# Patient Record
Sex: MALE | Race: WHITE | Employment: OTHER | URBAN - METROPOLITAN AREA
[De-identification: names, ages, dates, MRNs, and addresses within clinical notes are randomized per-mention and may not be internally consistent; named-entity substitution may affect disease eponyms.]

---

## 2018-12-04 ENCOUNTER — TRANSFERRED RECORDS (OUTPATIENT)
Dept: HEALTH INFORMATION MANAGEMENT | Facility: CLINIC | Age: 63
End: 2018-12-04

## 2019-01-04 ENCOUNTER — TRANSFERRED RECORDS (OUTPATIENT)
Dept: HEALTH INFORMATION MANAGEMENT | Facility: CLINIC | Age: 64
End: 2019-01-04

## 2019-01-04 LAB — EJECTION FRACTION: 56

## 2019-01-08 ENCOUNTER — TRANSFERRED RECORDS (OUTPATIENT)
Dept: HEALTH INFORMATION MANAGEMENT | Facility: CLINIC | Age: 64
End: 2019-01-08

## 2019-04-11 ENCOUNTER — TRANSFERRED RECORDS (OUTPATIENT)
Dept: HEALTH INFORMATION MANAGEMENT | Facility: CLINIC | Age: 64
End: 2019-04-11

## 2019-04-18 ENCOUNTER — TRANSFERRED RECORDS (OUTPATIENT)
Dept: HEALTH INFORMATION MANAGEMENT | Facility: CLINIC | Age: 64
End: 2019-04-18

## 2019-05-14 ENCOUNTER — TRANSFERRED RECORDS (OUTPATIENT)
Dept: HEALTH INFORMATION MANAGEMENT | Facility: CLINIC | Age: 64
End: 2019-05-14

## 2019-05-20 NOTE — TELEPHONE ENCOUNTER
ONCOLOGY INTAKE: Records Information     APPT INFORMATION:  Referring provider: Dr. Sae Lambert  Referring provider s clinic:  [facility]  Reason for visit/diagnosis:  Possible airway dilation, hx of sarcoidosis and histoplasmosis  Has patient been notified of appointment date and time?: Yes    RECORDS INFORMATION:  Were the records received with the referral (via [employer])? Yes - Referral only    Has patient been seen for any external appt for this diagnosis? Yes    If yes, where? [facility]    Has patient had any imaging or procedures outside of [place]  [place] for this condition? Yes  If Yes, where? [facility]     ADDITIONAL INFORMATION:  Patient states he signed an FREDY for [facility]

## 2019-05-21 NOTE — TELEPHONE ENCOUNTER
RECORDS STATUS - ALL OTHER DIAGNOSIS      RECORDS RECEIVED FROM: [facility]/""   DATE RECEIVED:    NOTES STATUS DETAILS   OFFICE NOTE from referring provider CE-Tamara Lambert, most recent OV 5/16/19   OFFICE NOTE from medical oncologist     DISCHARGE SUMMARY from hospital     DISCHARGE REPORT from the ER     OPERATIVE REPORT     MEDICATION LIST     CLINICAL TRIAL TREATMENTS TO DATE     LABS     PATHOLOGY REPORTS     ANYTHING RELATED TO DIAGNOSIS     GENONOMIC TESTING     TYPE:     IMAGING (NEED IMAGES & REPORT)     CT SCANS CE-""/[facility] 1/4/19 - Report in CE, Requested.    MRI     MAMMO     ULTRASOUND     PET     Complete PFT W/ Bronchodilator CE- [facility] 4/18/19, Report in CE   XR CE-""/[facility] 12/4/18, Report in CE - Requested

## 2019-05-22 DIAGNOSIS — J98.8: Primary | ICD-10-CM

## 2019-05-22 NOTE — TELEPHONE ENCOUNTER
Images from Tamara resolved, and now viewable in PACS    17 pages of PFT's rec'd from [facility], faxed to HIM  3:10 PM

## 2019-05-22 NOTE — TELEPHONE ENCOUNTER
Images requested from Tamara/[facility]. Nam w/ [facility] in Med Rec - he will fax over PFT's  2:20 PM

## 2019-06-23 ASSESSMENT — ENCOUNTER SYMPTOMS
FATIGUE: 1
SHORTNESS OF BREATH: 1

## 2019-06-25 ENCOUNTER — ANCILLARY PROCEDURE (OUTPATIENT)
Dept: CT IMAGING | Facility: CLINIC | Age: 64
End: 2019-06-25
Attending: CLINICAL NURSE SPECIALIST
Payer: COMMERCIAL

## 2019-06-25 ENCOUNTER — PRE VISIT (OUTPATIENT)
Dept: PULMONOLOGY | Facility: CLINIC | Age: 64
End: 2019-06-25

## 2019-06-25 ENCOUNTER — OFFICE VISIT (OUTPATIENT)
Dept: PULMONOLOGY | Facility: CLINIC | Age: 64
End: 2019-06-25
Attending: INTERNAL MEDICINE
Payer: COMMERCIAL

## 2019-06-25 VITALS
WEIGHT: 160.9 LBS | RESPIRATION RATE: 18 BRPM | OXYGEN SATURATION: 98 % | HEART RATE: 77 BPM | SYSTOLIC BLOOD PRESSURE: 148 MMHG | DIASTOLIC BLOOD PRESSURE: 95 MMHG | BODY MASS INDEX: 25.86 KG/M2 | HEIGHT: 66 IN | TEMPERATURE: 98.4 F

## 2019-06-25 DIAGNOSIS — J98.8: ICD-10-CM

## 2019-06-25 DIAGNOSIS — J98.8: Primary | ICD-10-CM

## 2019-06-25 DIAGNOSIS — R91.8 PULMONARY NODULES: Primary | ICD-10-CM

## 2019-06-25 RX ORDER — BUDESONIDE AND FORMOTEROL FUMARATE DIHYDRATE 80; 4.5 UG/1; UG/1
2 AEROSOL RESPIRATORY (INHALATION)
COMMUNITY
Start: 2005-02-01

## 2019-06-25 RX ORDER — FEXOFENADINE HCL 180 MG/1
180 TABLET ORAL DAILY
COMMUNITY
Start: 2018-07-15

## 2019-06-25 RX ORDER — ATORVASTATIN CALCIUM 10 MG/1
10 TABLET, FILM COATED ORAL DAILY
COMMUNITY
Start: 2018-09-01

## 2019-06-25 RX ORDER — ALBUTEROL SULFATE 90 UG/1
2 AEROSOL, METERED RESPIRATORY (INHALATION)
COMMUNITY
Start: 2017-11-20

## 2019-06-25 RX ORDER — CETIRIZINE HYDROCHLORIDE 10 MG/1
10 TABLET ORAL 2 TIMES DAILY
COMMUNITY
Start: 2019-06-01

## 2019-06-25 RX ORDER — PAROXETINE 40 MG/1
40 TABLET, FILM COATED ORAL EVERY MORNING
COMMUNITY

## 2019-06-25 ASSESSMENT — PAIN SCALES - GENERAL: PAINLEVEL: NO PAIN (0)

## 2019-06-25 ASSESSMENT — MIFFLIN-ST. JEOR: SCORE: 1457.59

## 2019-06-25 NOTE — NURSING NOTE
"Oncology Rooming Note    June 25, 2019 7:27 AM   Eddie Jones is a 63 year old male who presents for:    Chief Complaint   Patient presents with     Oncology Clinic Visit     Pt is here for a New Lung Nodule     Initial Vitals: Blood Pressure (Abnormal) 148/95   Pulse 77   Temperature 98.4  F (36.9  C) (Oral)   Respiration 18   Height 1.676 m (5' 6\")   Weight 73 kg (160 lb 14.4 oz)   Oxygen Saturation 98%   Body Mass Index 25.97 kg/m   Estimated body mass index is 25.97 kg/m  as calculated from the following:    Height as of this encounter: 1.676 m (5' 6\").    Weight as of this encounter: 73 kg (160 lb 14.4 oz). Body surface area is 1.84 meters squared.  No Pain (0) Comment: Data Unavailable   No LMP for male patient.  Allergies reviewed: Yes  Medications reviewed: Yes    Medications: Medication refills not needed today.  Pharmacy name entered into [employer]: [facility] [address] [place] [address] - [address]    Clinical concerns: none       Ericka Glass MA              "

## 2019-06-25 NOTE — LETTER
6/25/2019       RE: Eddie Jones  [address] [facility] [address]     Dear Colleague,    Thank you for referring your patient, Eddie Jones, to the [facility] at [facility]. Please see a copy of my visit note below.    LUNG NODULE & INTERVENTIONAL PULMONARY CLINIC  [facility] & SURGERY [facility], [facility]     Eddie Jones MRN# [id number]   Age: 63 year old YOB: 1955     Reason for Consultation: lung nodule(s) and lung abnormality     Requesting Physician: Nikko Lambert MD  [facility]  [address] [place] [id number]       Assessment and Plan:    1. RM and BI airway stenosis. Could be 2/2 endobronchial histoplasmosis vs sarcoidosis. Given progressive sx, it would reasonable to pursue bronchoscopy and airway intervention. He is agreeable.     2. New multiple pulmonary lung nodule(s). Given the characteristics on current/previous imaging and risk factors; I would classify this to be Intermediate (6-65%) risk for cancer. Multiple bilateral sub4mm and micronodules c/w old histo vs sarcoid. Has spiculated CINDY 6x5mm nodule which should undergo surveillance. Plan next CT in 6mo.     Billing: The patient was seen and examined by me and the findings, assessment, and plan as documented was explained to the patient/family who expressed understand.     Robert Perkins MD   of Medicine  Interventional Pulmonology  Department of Pulmonary, Allergy, Critical Care and Sleep Medicine   [facility]  Pager: [phone]          History:     Eddie Jones is a 63 year old male with sig h/o for COPD, histoplasmosis, sarcoidosis, depression, hyperlipidemia who is here for evaluation/followup of airway stenosis.    - Has baseline MCDUFFIE however has progressed over the past 2 years. No cough, fever or night sweats. Saw pulmonologist at [place] and  referred here for possible airway intervention.   - Personal hx of cancer: No. Up-to-date on c-scope.   - Family hx of cancer: No lung cancer. Mom had breast cancer. Brother  of pancreatic cancer.   - Exposure hx: Denies asbestos or radon exposure   - Tobacco hx: Past Smoker: 1ppd for 6years. Quit 30yrs ago.   - My interpretation of the images relevant for this visit includes: right mainstem and BI stenosis    - My interpretation of the PFT's relevant for this visit includes: Obstructive pattern     Culprit Nodule(s):   Multiple bilateral lung nodule(s) that are sub 4 mm. First seen by chest CT on . First observed on this date     Other active medical problems include:   - First dx with histo and sarcoidosis in . Treated with IV itraconazole. Bronchoscopy revealed sarcoidosis at that time and no other treatment noted.   - Has COPD. Severe obstruction on PFT's in 2019. On symbicort and albuterol.           Past Medical History:   COPD, depression, hyperlipidemia, histoplasmosis, sarcoidosis         Past Surgical History:    No past surgical history on file.          Social History:     Social History     Tobacco Use     Smoking status: Not on file   Substance Use Topics     Alcohol use: Not on file          Family History:   No family history on file.        Allergies:      Allergies   Allergen Reactions     Compazine Other (See Comments) and Swelling     Prochlorperazine Other (See Comments)     Swollen tongue/stiff neck  Swollen tongue and stiff neck   Compazine TABS            Medications:     Current Outpatient Medications   Medication Sig     albuterol (VENTOLIN HFA) 108 (90 Base) MCG/ACT inhaler Inhale 2 puffs into the lungs     atorvastatin (LIPITOR) 10 MG tablet Take 10 mg by mouth     budesonide-formoterol (SYMBICORT) 80-4.5 MCG/ACT Inhaler Inhale 2 puffs into the lungs     cetirizine (ZYRTEC ALLERGY) 10 MG tablet Take 10 mg by mouth     fexofenadine (ALLEGRA) 180 MG tablet Take 180 mg by mouth      ranitidine (ZANTAC) 150 MG capsule      PARoxetine (PAXIL) 40 MG tablet Take 40 mg by mouth     No current facility-administered medications for this visit.           Review of Systems:     CONSTITUTIONAL: negative for fever, chills, change in weight  INTEGUMENTARY/SKIN: no rash or obvious new lesions  ENT/MOUTH: no sore throat, new sinus pain or nasal drainage  RESP: see interval history  CV: negative for chest pain, palpitations or peripheral edema  GI: no nausea, vomiting, change in stools  : no dysuria  MUSCULOSKELETAL: no myalgias, arthralgias  ENDOCRINE: blood sugars with adequate control  PSYCHIATRIC: mood stable  LYMPHATIC: no new lymphadenopathy  HEME: no bleeding or easy bruisability  NEURO: no numbness, weakness, headaches         Physical Exam:     Temp:  [98.4  F (36.9  C)] 98.4  F (36.9  C)  Pulse:  [77] 77  Resp:  [18] 18  BP: (148)/(95) 148/95  SpO2:  [98 %] 98 %  Wt Readings from Last 4 Encounters:   06/25/19 73 kg (160 lb 14.4 oz)     Constitutional:   Awake, alert and in no apparent distress     Eyes:   Nonicteric, DOMINIQUE     ENT:    Trachea is midline. No gross neck abnormalities      Neck:   Supple without supraclavicular or cervical lymphadenopathy     Lungs:   Good air flow.  No crackles. No rhonchi.  No wheezes.     Cardiovascular:   Normal S1 and S2.  RRR.  No murmur, gallop or rub.  Radial, DP and PT pulses normal and symmetric     Abdomen:   NABS, soft, nontender, nondistended.  No HSM.     Musculoskeletal:   No edema.      Neurologic:   Alert and conversant. Cranial nerves  intact.       Skin:   Warm, dry.  No rash on limited exam.           Current Laboratory Data:   All laboratory and imaging data reviewed.           Previous Cardiology Imaging   No results found for this or any previous visit (from the past 8760 hour(s)).               Again, thank you for allowing me to participate in the care of your patient.      Sincerely,    Robert Perkins MD

## 2019-06-25 NOTE — PROGRESS NOTES
LUNG NODULE & INTERVENTIONAL PULMONARY CLINIC  [facility]     Eddie Jones MRN# [id number]   Age: 63 year old YOB: 1955     Reason for Consultation: lung nodule(s) and lung abnormality     Requesting Physician: Nikko Lambert MD  [facility]  [address] [place] [address]       Assessment and Plan:    1. RM and BI airway stenosis. Could be 2/2 endobronchial histoplasmosis vs sarcoidosis. Given progressive sx, it would reasonable to pursue bronchoscopy and airway intervention. He is agreeable.     2. New multiple pulmonary lung nodule(s). Given the characteristics on current/previous imaging and risk factors; I would classify this to be Intermediate (6-65%) risk for cancer. Multiple bilateral sub4mm and micronodules c/w old histo vs sarcoid. Has spiculated CINDY 6x5mm nodule which should undergo surveillance. Plan next CT in 6mo.     Billing: The patient was seen and examined by me and the findings, assessment, and plan as documented was explained to the patient/family who expressed understand.     Robert Perkins MD   of Medicine  Interventional Pulmonology  Department of Pulmonary, Allergy, Critical Care and Sleep Medicine   [facility]  Pager: [phone]          History:     Eddie Jones is a 63 year old male with sig h/o for COPD, histoplasmosis, sarcoidosis, depression, hyperlipidemia who is here for evaluation/followup of airway stenosis.    - Has baseline MCDUFFIE however has progressed over the past 2 years. No cough, fever or night sweats. Saw pulmonologist at [place] and referred here for possible airway intervention.   - Personal hx of cancer: No. Up-to-date on c-scope.   - Family hx of cancer: No lung cancer. Mom had breast cancer. Brother  of pancreatic cancer.   - Exposure hx: Denies asbestos or radon exposure   - Tobacco hx: Past Smoker: 1ppd for  6years. Quit 30yrs ago.   - My interpretation of the images relevant for this visit includes: right mainstem and BI stenosis    - My interpretation of the PFT's relevant for this visit includes: Obstructive pattern     Culprit Nodule(s):   Multiple bilateral lung nodule(s) that are sub 4 mm. First seen by chest CT on 2019. First observed on this date     Other active medical problems include:   - First dx with histo and sarcoidosis in 1992. Treated with IV itraconazole. Bronchoscopy revealed sarcoidosis at that time and no other treatment noted.   - Has COPD. Severe obstruction on PFT's in 5/2019. On symbicort and albuterol.           Past Medical History:   COPD, depression, hyperlipidemia, histoplasmosis, sarcoidosis         Past Surgical History:    No past surgical history on file.          Social History:     Social History     Tobacco Use     Smoking status: Not on file   Substance Use Topics     Alcohol use: Not on file          Family History:   No family history on file.        Allergies:      Allergies   Allergen Reactions     Compazine Other (See Comments) and Swelling     Prochlorperazine Other (See Comments)     Swollen tongue/stiff neck  Swollen tongue and stiff neck   Compazine TABS            Medications:     Current Outpatient Medications   Medication Sig     albuterol (VENTOLIN HFA) 108 (90 Base) MCG/ACT inhaler Inhale 2 puffs into the lungs     atorvastatin (LIPITOR) 10 MG tablet Take 10 mg by mouth     budesonide-formoterol (SYMBICORT) 80-4.5 MCG/ACT Inhaler Inhale 2 puffs into the lungs     cetirizine (ZYRTEC ALLERGY) 10 MG tablet Take 10 mg by mouth     fexofenadine (ALLEGRA) 180 MG tablet Take 180 mg by mouth     ranitidine (ZANTAC) 150 MG capsule      PARoxetine (PAXIL) 40 MG tablet Take 40 mg by mouth     No current facility-administered medications for this visit.           Review of Systems:     CONSTITUTIONAL: negative for fever, chills, change in weight  INTEGUMENTARY/SKIN: no rash or  obvious new lesions  ENT/MOUTH: no sore throat, new sinus pain or nasal drainage  RESP: see interval history  CV: negative for chest pain, palpitations or peripheral edema  GI: no nausea, vomiting, change in stools  : no dysuria  MUSCULOSKELETAL: no myalgias, arthralgias  ENDOCRINE: blood sugars with adequate control  PSYCHIATRIC: mood stable  LYMPHATIC: no new lymphadenopathy  HEME: no bleeding or easy bruisability  NEURO: no numbness, weakness, headaches         Physical Exam:     Temp:  [98.4  F (36.9  C)] 98.4  F (36.9  C)  Pulse:  [77] 77  Resp:  [18] 18  BP: (148)/(95) 148/95  SpO2:  [98 %] 98 %  Wt Readings from Last 4 Encounters:   06/25/19 73 kg (160 lb 14.4 oz)     Constitutional:   Awake, alert and in no apparent distress     Eyes:   Nonicteric, DOMINIQUE     ENT:    Trachea is midline. No gross neck abnormalities      Neck:   Supple without supraclavicular or cervical lymphadenopathy     Lungs:   Good air flow.  No crackles. No rhonchi.  No wheezes.     Cardiovascular:   Normal S1 and S2.  RRR.  No murmur, gallop or rub.  Radial, DP and PT pulses normal and symmetric     Abdomen:   NABS, soft, nontender, nondistended.  No HSM.     Musculoskeletal:   No edema.      Neurologic:   Alert and conversant. Cranial nerves  intact.       Skin:   Warm, dry.  No rash on limited exam.           Current Laboratory Data:   All laboratory and imaging data reviewed.           Previous Cardiology Imaging   No results found for this or any previous visit (from the past 8760 hour(s)).

## 2019-06-30 ENCOUNTER — TRANSFERRED RECORDS (OUTPATIENT)
Dept: HEALTH INFORMATION MANAGEMENT | Facility: CLINIC | Age: 64
End: 2019-06-30

## 2019-07-02 ENCOUNTER — MYC MEDICAL ADVICE (OUTPATIENT)
Dept: PULMONOLOGY | Facility: CLINIC | Age: 64
End: 2019-07-02

## 2019-07-05 NOTE — TELEPHONE ENCOUNTER
Spoke with patient to schedule procedure with Dr. Zuri Boswell   Procedure was scheduled on 07/26 at [facility]  Patient will have H&P with Dr. Zuri Boswell   Patient is aware a / is needed day of surgery.   Surgery letter was sent via [employer], patient has my direct contact information for any further questions.

## 2019-07-24 RX ORDER — VALSARTAN 80 MG/1
80 TABLET ORAL DAILY
COMMUNITY

## 2019-07-25 ENCOUNTER — ANESTHESIA EVENT (OUTPATIENT)
Dept: SURGERY | Facility: CLINIC | Age: 64
End: 2019-07-25
Payer: COMMERCIAL

## 2019-07-25 ASSESSMENT — COPD QUESTIONNAIRES: COPD: 1

## 2019-07-25 NOTE — ANESTHESIA PREPROCEDURE EVALUATION
Anesthesia Pre-Procedure Evaluation    Patient: Eddie Jones   MRN:     [id number] Gender:   male   Age:    64 year old :      1955        Preoperative Diagnosis: Air Evaluation   Procedure(s):  Flexible Bronchoscopy, Rigid Bronchoscopy, Tumor Debulking, Possible Stent Placement     History reviewed. No pertinent past medical history.   History reviewed. No pertinent surgical history.       Anesthesia Evaluation     .             ROS/MED HX    ENT/Pulmonary: Comment: Right main and bronchus intermedius stenosis   Sarcoidosis   Histoplasmosis     (+)COPD, , . .    Neurologic:  - neg neurologic ROS     Cardiovascular:     (+) Dyslipidemia, hypertension----. : . . . :. .       METS/Exercise Tolerance:     Hematologic:         Musculoskeletal:         GI/Hepatic:     (+) hepatitis type C, liver disease,       Renal/Genitourinary:         Endo:         Psychiatric:     (+) psychiatric history depression      Infectious Disease:         Malignancy:         Other:                         PHYSICAL EXAM:   Mental Status/Neuro:    Airway: Facies: Feasible  Mallampati: II  Mouth/Opening: Full  TM distance: > 6 cm  Neck ROM: Full   Respiratory: Auscultation: CTAB     Resp. Rate: Normal     Resp. Effort: Normal      CV: Rhythm: Regular  Heart: Normal Sounds  Edema: None   Comments:      Dental: Details                  LABS:  CBC: No results found for: WBC, HGB, HCT, PLT  BMP: No results found for: NA, POTASSIUM, CHLORIDE, CO2, BUN, CR, GLC  COAGS: No results found for: PTT, INR, FIBR  POC: No results found for: BGM, HCG, HCGS  OTHER: No results found for: PH, LACT, A1C, CARLITO, PHOS, MAG, ALBUMIN, PROTTOTAL, ALT, AST, GGT, ALKPHOS, BILITOTAL, BILIDIRECT, LIPASE, AMYLASE, VANESA, TSH, T4, T3, CRP, SED     Preop Vitals    BP Readings from Last 3 Encounters:   19 (!) 148/95    Pulse Readings from Last 3 Encounters:   19 77      Resp Readings from Last 3 Encounters:   19 18    SpO2 Readings from Last 3  "Encounters:   06/25/19 98%      Temp Readings from Last 1 Encounters:   06/25/19 36.9  C (98.4  F) (Oral)    Ht Readings from Last 1 Encounters:   06/25/19 1.676 m (5' 6\")      Wt Readings from Last 1 Encounters:   06/25/19 73 kg (160 lb 14.4 oz)    Estimated body mass index is 25.97 kg/m  as calculated from the following:    Height as of 6/25/19: 1.676 m (5' 6\").    Weight as of 6/25/19: 73 kg (160 lb 14.4 oz).     LDA:        Assessment:   ASA SCORE: 3    H&P: History and physical reviewed and following examination; no interval change.   Smoking Status:  Non-Smoker/Unknown   NPO Status: NPO Appropriate     Plan:   Anes. Type:  General   Pre-Medication: None   Induction:  IV (Standard)   Airway: ETT; Oral   Access/Monitoring: PIV   Maintenance: TIVA     Postop Plan:   Postop Pain: Opioids  Postop Sedation/Airway: Not planned     PONV Management:   Adult Risk Factors:, Non-Smoker, Postop Opioids   Prevention: Ondansetron, Dexamethasone, No Volatiles     CONSENT: Direct conversation   Plan and risks discussed with: Patient   Blood Products: Consent Deferred (Minimal Blood Loss)                   Robert Duckworth MD  "

## 2019-07-26 ENCOUNTER — APPOINTMENT (OUTPATIENT)
Dept: GENERAL RADIOLOGY | Facility: CLINIC | Age: 64
End: 2019-07-26
Attending: ANESTHESIOLOGY
Payer: COMMERCIAL

## 2019-07-26 ENCOUNTER — ANESTHESIA (OUTPATIENT)
Dept: SURGERY | Facility: CLINIC | Age: 64
End: 2019-07-26
Payer: COMMERCIAL

## 2019-07-26 ENCOUNTER — HOSPITAL ENCOUNTER (OUTPATIENT)
Facility: CLINIC | Age: 64
Discharge: HOME | End: 2019-07-26
Attending: INTERNAL MEDICINE | Admitting: INTERNAL MEDICINE
Payer: COMMERCIAL

## 2019-07-26 VITALS
TEMPERATURE: 97.9 F | OXYGEN SATURATION: 94 % | HEART RATE: 104 BPM | HEIGHT: 66 IN | RESPIRATION RATE: 18 BRPM | DIASTOLIC BLOOD PRESSURE: 98 MMHG | SYSTOLIC BLOOD PRESSURE: 138 MMHG | BODY MASS INDEX: 25.97 KG/M2 | WEIGHT: 161.6 LBS

## 2019-07-26 LAB
GLUCOSE BLDC GLUCOMTR-MCNC: 98 MG/DL (ref 70–99)
RADIOLOGIST FLAGS: ABNORMAL

## 2019-07-26 PROCEDURE — 25000128 H RX IP 250 OP 636: Performed by: ANESTHESIOLOGY

## 2019-07-26 PROCEDURE — 71045 X-RAY EXAM CHEST 1 VIEW: CPT

## 2019-07-26 PROCEDURE — 25000125 ZZHC RX 250: Performed by: ANESTHESIOLOGY

## 2019-07-26 PROCEDURE — 36000059 ZZH SURGERY LEVEL 3 EA 15 ADDTL MIN UMMC: Performed by: INTERNAL MEDICINE

## 2019-07-26 PROCEDURE — 27210794 ZZH OR GENERAL SUPPLY STERILE: Performed by: INTERNAL MEDICINE

## 2019-07-26 PROCEDURE — 25800030 ZZH RX IP 258 OP 636: Performed by: ANESTHESIOLOGY

## 2019-07-26 PROCEDURE — 71045 X-RAY EXAM CHEST 1 VIEW: CPT | Mod: 76

## 2019-07-26 PROCEDURE — 25000125 ZZHC RX 250: Performed by: NURSE ANESTHETIST, CERTIFIED REGISTERED

## 2019-07-26 PROCEDURE — 25000128 H RX IP 250 OP 636: Performed by: NURSE ANESTHETIST, CERTIFIED REGISTERED

## 2019-07-26 PROCEDURE — 82962 GLUCOSE BLOOD TEST: CPT

## 2019-07-26 PROCEDURE — 37000008 ZZH ANESTHESIA TECHNICAL FEE, 1ST 30 MIN: Performed by: INTERNAL MEDICINE

## 2019-07-26 PROCEDURE — 71000014 ZZH RECOVERY PHASE 1 LEVEL 2 FIRST HR: Performed by: INTERNAL MEDICINE

## 2019-07-26 PROCEDURE — 88305 TISSUE EXAM BY PATHOLOGIST: CPT | Performed by: INTERNAL MEDICINE

## 2019-07-26 PROCEDURE — 37000009 ZZH ANESTHESIA TECHNICAL FEE, EACH ADDTL 15 MIN: Performed by: INTERNAL MEDICINE

## 2019-07-26 PROCEDURE — 71000027 ZZH RECOVERY PHASE 2 EACH 15 MINS: Performed by: INTERNAL MEDICINE

## 2019-07-26 PROCEDURE — 40000170 ZZH STATISTIC PRE-PROCEDURE ASSESSMENT II: Performed by: INTERNAL MEDICINE

## 2019-07-26 PROCEDURE — 36000057 ZZH SURGERY LEVEL 3 1ST 30 MIN - UMMC: Performed by: INTERNAL MEDICINE

## 2019-07-26 PROCEDURE — 25800030 ZZH RX IP 258 OP 636: Performed by: NURSE ANESTHETIST, CERTIFIED REGISTERED

## 2019-07-26 PROCEDURE — 71000015 ZZH RECOVERY PHASE 1 LEVEL 2 EA ADDTL HR: Performed by: INTERNAL MEDICINE

## 2019-07-26 RX ORDER — SODIUM CHLORIDE, SODIUM LACTATE, POTASSIUM CHLORIDE, CALCIUM CHLORIDE 600; 310; 30; 20 MG/100ML; MG/100ML; MG/100ML; MG/100ML
INJECTION, SOLUTION INTRAVENOUS CONTINUOUS
Status: DISCONTINUED | OUTPATIENT
Start: 2019-07-26 | End: 2019-07-26 | Stop reason: HOSPADM

## 2019-07-26 RX ORDER — FENTANYL CITRATE 50 UG/ML
25-50 INJECTION, SOLUTION INTRAMUSCULAR; INTRAVENOUS EVERY 5 MIN PRN
Status: DISCONTINUED | OUTPATIENT
Start: 2019-07-26 | End: 2019-07-26 | Stop reason: HOSPADM

## 2019-07-26 RX ORDER — LIDOCAINE 40 MG/G
CREAM TOPICAL
Status: DISCONTINUED | OUTPATIENT
Start: 2019-07-26 | End: 2019-07-26 | Stop reason: HOSPADM

## 2019-07-26 RX ORDER — DEXAMETHASONE SODIUM PHOSPHATE 4 MG/ML
10 INJECTION, SOLUTION INTRA-ARTICULAR; INTRALESIONAL; INTRAMUSCULAR; INTRAVENOUS; SOFT TISSUE ONCE
Status: COMPLETED | OUTPATIENT
Start: 2019-07-26 | End: 2019-07-26

## 2019-07-26 RX ORDER — FENTANYL CITRATE 50 UG/ML
25-50 INJECTION, SOLUTION INTRAMUSCULAR; INTRAVENOUS
Status: CANCELLED | OUTPATIENT
Start: 2019-07-26

## 2019-07-26 RX ORDER — MEPERIDINE HYDROCHLORIDE 25 MG/ML
12.5 INJECTION INTRAMUSCULAR; INTRAVENOUS; SUBCUTANEOUS
Status: DISCONTINUED | OUTPATIENT
Start: 2019-07-26 | End: 2019-07-26 | Stop reason: HOSPADM

## 2019-07-26 RX ORDER — PROPOFOL 10 MG/ML
INJECTION, EMULSION INTRAVENOUS PRN
Status: DISCONTINUED | OUTPATIENT
Start: 2019-07-26 | End: 2019-07-26

## 2019-07-26 RX ORDER — ONDANSETRON 2 MG/ML
4 INJECTION INTRAMUSCULAR; INTRAVENOUS EVERY 30 MIN PRN
Status: DISCONTINUED | OUTPATIENT
Start: 2019-07-26 | End: 2019-07-26 | Stop reason: HOSPADM

## 2019-07-26 RX ORDER — DEXAMETHASONE SODIUM PHOSPHATE 4 MG/ML
INJECTION, SOLUTION INTRA-ARTICULAR; INTRALESIONAL; INTRAMUSCULAR; INTRAVENOUS; SOFT TISSUE PRN
Status: DISCONTINUED | OUTPATIENT
Start: 2019-07-26 | End: 2019-07-26

## 2019-07-26 RX ORDER — HYDROMORPHONE HYDROCHLORIDE 1 MG/ML
.3-.5 INJECTION, SOLUTION INTRAMUSCULAR; INTRAVENOUS; SUBCUTANEOUS EVERY 10 MIN PRN
Status: DISCONTINUED | OUTPATIENT
Start: 2019-07-26 | End: 2019-07-26 | Stop reason: HOSPADM

## 2019-07-26 RX ORDER — ALBUTEROL SULFATE 0.83 MG/ML
2.5 SOLUTION RESPIRATORY (INHALATION)
Status: COMPLETED | OUTPATIENT
Start: 2019-07-26 | End: 2019-07-26

## 2019-07-26 RX ORDER — PROPOFOL 10 MG/ML
INJECTION, EMULSION INTRAVENOUS CONTINUOUS PRN
Status: DISCONTINUED | OUTPATIENT
Start: 2019-07-26 | End: 2019-07-26

## 2019-07-26 RX ORDER — LIDOCAINE HYDROCHLORIDE 20 MG/ML
INJECTION, SOLUTION INFILTRATION; PERINEURAL PRN
Status: DISCONTINUED | OUTPATIENT
Start: 2019-07-26 | End: 2019-07-26

## 2019-07-26 RX ORDER — NALOXONE HYDROCHLORIDE 0.4 MG/ML
.1-.4 INJECTION, SOLUTION INTRAMUSCULAR; INTRAVENOUS; SUBCUTANEOUS
Status: DISCONTINUED | OUTPATIENT
Start: 2019-07-26 | End: 2019-07-26 | Stop reason: HOSPADM

## 2019-07-26 RX ORDER — ONDANSETRON 2 MG/ML
INJECTION INTRAMUSCULAR; INTRAVENOUS PRN
Status: DISCONTINUED | OUTPATIENT
Start: 2019-07-26 | End: 2019-07-26

## 2019-07-26 RX ORDER — FENTANYL CITRATE 50 UG/ML
INJECTION, SOLUTION INTRAMUSCULAR; INTRAVENOUS PRN
Status: DISCONTINUED | OUTPATIENT
Start: 2019-07-26 | End: 2019-07-26

## 2019-07-26 RX ORDER — ONDANSETRON 4 MG/1
4 TABLET, ORALLY DISINTEGRATING ORAL EVERY 30 MIN PRN
Status: DISCONTINUED | OUTPATIENT
Start: 2019-07-26 | End: 2019-07-26 | Stop reason: HOSPADM

## 2019-07-26 RX ADMIN — ROCURONIUM BROMIDE 50 MG: 10 INJECTION INTRAVENOUS at 08:16

## 2019-07-26 RX ADMIN — PROPOFOL 100 MG: 10 INJECTION, EMULSION INTRAVENOUS at 08:16

## 2019-07-26 RX ADMIN — PHENYLEPHRINE HYDROCHLORIDE 100 MCG: 10 INJECTION INTRAVENOUS at 08:37

## 2019-07-26 RX ADMIN — HYDROMORPHONE HYDROCHLORIDE 0.3 MG: 1 INJECTION, SOLUTION INTRAMUSCULAR; INTRAVENOUS; SUBCUTANEOUS at 09:38

## 2019-07-26 RX ADMIN — PHENYLEPHRINE HYDROCHLORIDE 100 MCG: 10 INJECTION INTRAVENOUS at 08:26

## 2019-07-26 RX ADMIN — LIDOCAINE HYDROCHLORIDE 3 ML: 40 INJECTION, SOLUTION RETROBULBAR; TOPICAL at 07:12

## 2019-07-26 RX ADMIN — MIDAZOLAM 1 MG: 1 INJECTION INTRAMUSCULAR; INTRAVENOUS at 08:05

## 2019-07-26 RX ADMIN — LIDOCAINE HYDROCHLORIDE 60 MG: 20 INJECTION, SOLUTION INFILTRATION; PERINEURAL at 08:16

## 2019-07-26 RX ADMIN — HYDROMORPHONE HYDROCHLORIDE 0.2 MG: 1 INJECTION, SOLUTION INTRAMUSCULAR; INTRAVENOUS; SUBCUTANEOUS at 09:53

## 2019-07-26 RX ADMIN — FENTANYL CITRATE 100 MCG: 50 INJECTION, SOLUTION INTRAMUSCULAR; INTRAVENOUS at 08:16

## 2019-07-26 RX ADMIN — ONDANSETRON 4 MG: 2 INJECTION INTRAMUSCULAR; INTRAVENOUS at 08:24

## 2019-07-26 RX ADMIN — SUGAMMADEX 200 MG: 100 INJECTION, SOLUTION INTRAVENOUS at 08:41

## 2019-07-26 RX ADMIN — DEXAMETHASONE SODIUM PHOSPHATE 10 MG: 4 INJECTION, SOLUTION INTRAMUSCULAR; INTRAVENOUS at 11:16

## 2019-07-26 RX ADMIN — ALBUTEROL SULFATE 2.5 MG: 2.5 SOLUTION RESPIRATORY (INHALATION) at 09:14

## 2019-07-26 RX ADMIN — SODIUM CHLORIDE, POTASSIUM CHLORIDE, SODIUM LACTATE AND CALCIUM CHLORIDE: 600; 310; 30; 20 INJECTION, SOLUTION INTRAVENOUS at 08:05

## 2019-07-26 RX ADMIN — DEXAMETHASONE SODIUM PHOSPHATE 10 MG: 4 INJECTION, SOLUTION INTRA-ARTICULAR; INTRALESIONAL; INTRAMUSCULAR; INTRAVENOUS; SOFT TISSUE at 08:24

## 2019-07-26 RX ADMIN — PROPOFOL 150 MCG/KG/MIN: 10 INJECTION, EMULSION INTRAVENOUS at 08:23

## 2019-07-26 ASSESSMENT — MIFFLIN-ST. JEOR: SCORE: 1455.75

## 2019-07-26 NOTE — OR NURSING
Called Dr. Duckworth to bedside to assess pt due to little improvement to lung sounds following neb treatment and new complaint of R sided pleuritic pain with inhalation. MD assessed pt and ordered chest xray. Dr. Boswell made aware and agrees with current plan of care.     1000: Notified Dr. Boswell and Dr. Duckworth that chest xray is complete.

## 2019-07-26 NOTE — DISCHARGE INSTRUCTIONS
Post Bronchoscopy Patient Instructions:    July 26, 2019  Eddie Jones    Your procedure completed (bronchoscopy with biopsies) without any immediate complications.  You may cough up scant amount of blood for the next 12 hours. If you have excessive cough with blood, chest pain, shortness of breath, please report to the closest emergency room.    You may experience low grade (less than 100.5 F) fever next 24 hours, if so you can take tylenol. If the fever persists more than 24 hours contact to our office or your primary care provider.    Our office (Thoracic/Pulmonary--[phone]) will call you as soon as the results of biopsies are ready.    May resume your regular diet as it was prior to procedure.    Should you have any question, please do not hesitate to call our office.    WOODY Boswell MD    [facility] [place]  Same-Day Surgery   Adult Discharge Orders & Instructions     For 24 hours after surgery    1. Get plenty of rest.  A responsible adult must stay with you for at least 24 hours after you leave the hospital.   2. Do not drive or use heavy equipment.  If you have weakness or tingling, don't drive or use heavy equipment until this feeling goes away.  3. Do not drink alcohol.  4. Avoid strenuous or risky activities.  Ask for help when climbing stairs.   5. You may feel lightheaded.  IF so, sit for a few minutes before standing.  Have someone help you get up.   6. If you have nausea (feel sick to your stomach): Drink only clear liquids such as apple juice, ginger ale, broth or 7-Up.  Rest may also help.  Be sure to drink enough fluids.  Move to a regular diet as you feel able.  7. You may have a slight fever. Call the doctor if your fever is over 100 F (37.7 C) (taken under the tongue) or lasts longer than 24 hours.  8. You may have a dry mouth, a sore throat, muscle aches or trouble sleeping.  These should go away after 24 hours.  9. Do not make important or legal  decisions.   Call your doctor for any of the followin.  Signs of infection (fever, growing tenderness at the surgery site, a large amount of drainage or bleeding, severe pain, foul-smelling drainage, redness, swelling).    2. It has been over 8 to 10 hours since surgery and you are still not able to urinate (pass water).    3.  Headache for over 24 hours.    To contact a doctor, call Dr. Boswell's office at [phone] or:        [phone] and ask for the resident on call for   Pulmonary service (answered 24 hours a day)      Emergency Department:    [facility]: [phone]       (TTY for hearing impaired: [phone])

## 2019-07-26 NOTE — OR NURSING
Post decadron chest xray done at 1230. Dr. Duckworth (anesthesia) at bedside in PACU. Reviewed chest xray with patient. States obstruction is improved and that he will enter anesthesia sign out.

## 2019-07-26 NOTE — OR NURSING
Writer spoke with Dr. Boswell (pulmonary) regarding post decadron chest xray and oxygenation saturations. Dr. Boswell states that chest xray is improved, looks good, and patient may discharge home.

## 2019-07-26 NOTE — PROCEDURES
INTERVENTIONAL PULMONOLOGY     Procedure(s):    A flexible bronchoscopy  Airway exam  Balloon bronchoplasty without stent placement, attempted with no success, see below  EBUS-TBNA, no enlarged nodes, therefore no biopsies were take, see below  Endobronchial forcep biopsies (1 sites), right sided secondary chapin    Indication:  Hx of sarcoidosis vs histoplasmosis based on bronch w biopsies in early 1990s    Attending of Record:  Zuri Boswell MD    Interventional Pulmonary Fellow   None    Trainees Present:   None    Medications:    General Anesthesia - See anesthesia flowsheet for details    Sedation Time:   Per Anesthesia Care Provider    Time Out:  Performed    I have reviewed the patient's medical record and indication for the procedure. Physical examination was performed.  The risks, benefits and alternatives of the procedure were discussed with the the patient in detail and he had the opportunity to ask questions.  I discussed in particular the potential complications including risks of minor or life-threatening bleeding and/or infection, respiratory failure, vocal cord trauma / paralysis, pneumothorax, and discomfort. Sedation risks were also discussed including abnormal heart rhythms, low blood pressure, and respiratory failure. All questions were answered to the best of my ability.  Verbal and written informed consent was obtained.  The proposed procedure and the patient's identification were verified prior to the procedure by the physician and the nurse.    The patient was assessed for the adequacy for the procedure and to receive medications.   Mental Status:  Alert and oriented x 3  Airway examination:  Class I (Complete visualization of soft palate)  Pulmonary:  wheeizing  CV:  RRR, no murmurs or gallops  ASA Grade:  (II)  Mild systemic disease    After clinical evaluation and reviewing the indication, risks, alternatives and benefits of the procedure the patient was deemed to be in satisfactory  condition to undergo the procedure.      A Tuberculosis risk assessment was performed:  The patient has no known RISK of Tuberculosis    The procedure was performed in a negative airflow room: The patient could not be moved to a negative airflow room because of no risk of TB    Maneuvers / Procedure:      Airway dilation: an elation balloon (8-10 mm, 2 cm) attempted to advance into the RLL sup seg, Lingula, RML however could not advance it due to narrowings at segmental/subsegmental level. These narrowings corresponds to CT chest findings  Airway Examination: A complete airway examination was performed from the distal trachea to the subsegmental level in each lobe of both lungs.  Pertinent findings include significant narrowing in almost all lobes at segmental/subsegmental level, see pics.     EBUS-examination performed in all stations both sites and station 7 and there was no discrete, non-calcified node to biopsy.  Endobronchial biopsies: One Site - The bronchoscope was inserted.  Topical epinephrine was not administered.  The biopsy forceps were introduced and endobronchial biopsies were obtained from RC1.  A total of 5 biopsies were obtained.    Any disposable equipment was visually inspected and deemed to be intact immediately post procedure.      Relevant Pictures    RUL      BI      RML      RLL, sup segment      True CINDY and lingula      LLL        Recommendations:     -->  Home, await path result  -->  Follow up per Dr. Sae Lambert at [facility] Zuri Boswell MD  [phone]

## 2019-07-26 NOTE — ANESTHESIA CARE TRANSFER NOTE
Patient: Eddie Jones    Procedure(s):  Flexible Bronchoscopy, Endobronchial Biopsy    Diagnosis: Air Evaluation  Diagnosis Additional Information: No value filed.    Anesthesia Type:   General     Note:  Airway :Face Mask  Patient transferred to:PACU  Handoff Report: Identifed the Patient, Identified the Reponsible Provider, Reviewed the pertinent medical history, Discussed the surgical course, Reviewed Intra-OP anesthesia mangement and issues during anesthesia, Set expectations for post-procedure period and Allowed opportunity for questions and acknowledgement of understanding      Vitals: (Last set prior to Anesthesia Care Transfer)    CRNA VITALS  7/26/2019 0824 - 7/26/2019 0859      7/26/2019             Pulse:  86    SpO2:  100 %    Resp Rate (observed):  16                Electronically Signed By: NITO Magaña CRNA  July 26, 2019  8:59 AM

## 2019-07-26 NOTE — PROGRESS NOTES
@ 8801 Report from Vivienne RN   IS care/use teaching and instructions provided to pt and responsible adult. Pt and wife provide Verbalization of understanding after teaching instructed.

## 2019-07-29 LAB — COPATH REPORT: NORMAL

## 2019-07-30 NOTE — ANESTHESIA POSTPROCEDURE EVALUATION
Anesthesia POST Procedure Evaluation    Patient: Eddie Jones   MRN:     [id number] Gender:   male   Age:    64 year old :      1955        Preoperative Diagnosis: Air Evaluation   Procedure(s):  Flexible Bronchoscopy, Endobronchial Biopsy   Postop Comments: No value filed.       Anesthesia Type:  Not documented  General    Reportable Event: NO     PAIN: Uncomplicated   Sign Out status: Comfortable, Well controlled pain     PONV: No PONV   Sign Out status:  No Nausea or Vomiting     Neuro/Psych: Uneventful perioperative course   Sign Out Status: Preoperative baseline; Age appropriate mentation     Airway/Resp.: Uneventful perioperative course   Sign Out Status: Non labored breathing, age appropriate RR; Resp. Status within EXPECTED Parameters     CV: Uneventful perioperative course   Sign Out status: Appropriate BP and perfusion indices; Appropriate HR/Rhythm     Disposition:   Sign Out in:  PACU  Disposition:  Phase II; Home  Recovery Course: Uneventful  Follow-Up: Not required           Last Anesthesia Record Vitals:  CRNA VITALS  2019 0824 - 2019 0924      2019             Pulse:  86    SpO2:  100 %    Resp Rate (observed):  16          Last PACU Vitals:  Vitals Value Taken Time   /93 2019  1:56 PM   Temp 36.8  C (98.2  F) 2019  1:56 PM   Pulse 89 2019  1:00 PM   Resp 18 2019  1:56 PM   SpO2 94 % 2019  1:56 PM   Temp src     NIBP     Pulse 86 2019  8:58 AM   SpO2 100 % 2019  8:58 AM   Resp     Temp     Ht Rate     Temp 2           Electronically Signed By: Robert Duckworth MD, 2019, 7:59 PM

## 2019-08-01 ENCOUNTER — REFERRAL (OUTPATIENT)
Dept: TRANSPLANT | Facility: CLINIC | Age: 64
End: 2019-08-01

## 2019-08-01 NOTE — LETTER
October 28, 2019    From:  [facility]     To: Mr. henry Jones  [address] [place] [facility] [address]      Dear Eddie Jones,    Thank you for considering the [facility] Transplant Program. Dr. Lambert referred you to our program for consideration of lung transplant evaluation. We have reviewed your records and you unfortunately do not meet our criteria for lung transplant evaluation. At this time you are deemed too well for lung transplant. If your pulmonary status changes in the future you may be re- referred at any time by your pulmonologist.      Though you do not meet our program s criteria for lung transplantation, we will be glad to meet with you in our clinic to explore other treatment options for your lung disease.      Thank you for considering the [facility] Transplant Program for your health care needs. Please feel free to contact us at [phone] if you would like to discuss our decision or with any concerns.       Sincerely,      Moe Morejon MD,  Medical Director, Lung Transplantation  [facility] Primary Care Provider        Referring Provider

## 2019-08-01 NOTE — LETTER
August 8, 2019      Eddie Jones  [address] [place] [address] [place] [address]      Dear Eddie,    Thank you for your interest in the Transplant Center at [facility], [facility]. We look forward to being a part of your care team and assisting you through the transplant process.    As we discussed, your transplant coordinator is Dominga Herrera (Lung).  You may call your coordinator at any time with questions or concerns.  Your first scheduled call will be on August 15, 2019.  If this needs to change, call [phone]. Option 6    Please complete the following.    1. Fill out and return the enclosed forms    Authorization for Electronic Communication    Authorization to Discuss Protected Health Information    Authorization for Release of Protected Health Information    Authorization for Care Everywhere Release of Information    2. Sign up for:    Verold, access to your electronic medical record (see enclosed pamphlet)    [employer], a transplant education website    You can use these tools to learn more about your transplant, communicate with your care team, and track your medical details      Sincerely,    Solid Organ Transplant  [facility]    Cc: Darinel Beyer(PCP), Nikko Lambert MD(Referring)

## 2019-08-05 VITALS — WEIGHT: 160 LBS | HEIGHT: 66 IN | BODY MASS INDEX: 25.71 KG/M2

## 2019-08-05 ASSESSMENT — MIFFLIN-ST. JEOR: SCORE: 1448.51

## 2019-08-05 NOTE — TELEPHONE ENCOUNTER
SOT LUNG INTAKE    August 5, 2019    Referring Provider: Nikko Lambert  Primary Provider: Darinel Beyer  Specialist: JOSE Granado, Hepatitis C  Diagnosis:not provided with referral  Per preop office note- Mainstem bronchial stenosis, Pulmonary Sacroidois, hx of Histoplasmosis  Source/Facility: [facility]    Critical History:  Smoking/nicotine use history: former- occasional smoker x 5yrs,  Quit approx. 35 yrs ago  Alcohol use history: recovering alcoholic, quit June 1, 2007  Drug use history: no  Cancer history:  Basal cell carcinoma,  Cardiac history:  no  BMI:25.9  No supplemental O2 use    Notes:   Insurance information:  [employer]  Policy loya:  ave Collado  Subscriber/policy/ID number:  [id number]  Group Number:  [id number]    Is patient in a group home/assisted living? no  Does patient have a guardian? no    Referral intake process completed.  Patient is aware that after financial approval is received, medical records will be requested.   Patient confirmed for a callback from transplant coordinator on August 15, 2019. (within 2 weeks)  Tentative evaluation date TBD. (within 4 weeks)    Confirmed coordinator will discuss evaluation process in more detail at the time of their call.   Patient is aware of the need to arrange age appropriate cancer screening, vaccinations, and dental care.  Reminded patient to complete questionnaire, complete medical records release, and review packet prior to evaluation visit .  Assessed patient for special needs (ie--wheelchair, assistance, guardian, and ):  none   Patient instructed to call [phone] with questions.

## 2019-08-08 ENCOUNTER — DOCUMENTATION ONLY (OUTPATIENT)
Dept: TRANSPLANT | Facility: CLINIC | Age: 64
End: 2019-08-08

## 2019-08-15 DIAGNOSIS — D86.0 SARCOIDOSIS OF LUNG (H): Primary | ICD-10-CM

## 2019-08-15 DIAGNOSIS — Z76.82 LUNG TRANSPLANT CANDIDATE: ICD-10-CM

## 2019-08-15 NOTE — TELEPHONE ENCOUNTER
"  SOT LUNG INTAKE    August 15, 2019    Eddie Jones  [id number]  Referring Provider: Nikko Lambert, Started seeing him about 5-6 months  Primary Provider: Darinel Beyer  Specialist: JOSE NAIR ([place] - Derrick Granado, Hepatitis C: Diagnosed approximately 5 years ago,   Diagnosis: Pulmonary Sacroidois Dx about 20 years ago via bronch; Hepatitis C; Rash (?)  Source/Facility: [facility]  Referral packet and welcome letter received? No  64 year old  Height: 5'6\"  Weight: 160lb  BMI: 26    Social History:    Social History     Socioeconomic History     Marital status:      Spouse name: Not on file     Number of children: Not on file     Years of education: Not on file     Highest education level: Not on file   Occupational History     Not on file   Social Needs     Financial resource strain: Not on file     Food insecurity:     Worry: Not on file     Inability: Not on file     Transportation needs:     Medical: Not on file     Non-medical: Not on file   Tobacco Use     Smoking status: Former Smoker     Last attempt to quit:      Years since quittin.6     Smokeless tobacco: Never Used     Tobacco comment: 5 yrs on and off,1/2 ppd, quit 35 yrs ago   Substance and Sexual Activity     Alcohol use: Not Currently     Comment: recovering alcoholic, quit 2007     Drug use: Never     Sexual activity: Not on file   Lifestyle     Physical activity:     Days per week: Not on file     Minutes per session: Not on file     Stress: Not on file   Relationships     Social connections:     Talks on phone: Not on file     Gets together: Not on file     Attends Denominational service: Not on file     Active member of club or organization: Not on file     Attends meetings of clubs or organizations: Not on file     Relationship status: Not on file     Intimate partner violence:     Fear of current or ex partner: Not on file     Emotionally abused: Not on file     Physically abused: Not on file     Forced sexual " activity: Not on file   Other Topics Concern     Parent/sibling w/ CABG, MI or angioplasty before 65F 55M? 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    Past Medical History  Pulmonary Manifestations date:    Details: started respiratory having respiratory issues since severe case of histoplasmosis then things like mowing the lawn and going up stairs caused shortness of breath, going up stairs was hard  Last six months things have gotten progressively worse cant walk up a full flight of stairs and walk far without being short or breath, heat and humidity struggling to catch is breath.     Diabetes: No personal history, Family history: Mother, developed early in life  Coronary Artery Disease: No personal history, Father: several heart attacks,  from heart attack  Hypertension: Recently prescribed Valsartin  Previous transfusion(s):  No  History of Cancer: Basal Cell Carcinoma on nose ;   Family History: Mother Breast CA 40s; Brother 54 Pancreatic; Brother with stomach alive  GERD: No  Bleeding Disorders: No personal history of bleeding or clotting disorder: Brother passed away from a stroke    Other Past Medical History:     Past Medical History:   Diagnosis Date     Cancer (H)     basal cell- nose, [facility]     Depressive disorder     diagnosed 20 yrs ago, on Paxil- PCP     Hepatitis     Hep C - followed by [facility]     Hypertension 2019    PCP       Surgical History   Lung Biopsy: No  Pneumothorax: Yes , 2019, occurred after Dincer's  procedure, given solumedrol,  was able to re-expand before he left clinic,   Chest Surgery: No    Past Surgical History:   Procedure Laterality Date     ABDOMEN SURGERY       APPENDECTOMY      55 years ago     BIOPSY      lung- [facility], [facility]     BIOPSY      skin-nose, back and chest- [facility]     BRONCHOSCOPY, DILATE BRONCHUS, STENT BRONCHUS, COMBINED N/A 7/26/2019    Procedure: Flexible Bronchoscopy, Endobronchial Biopsy;  Surgeon: Maynor Boswell MD;  Location: [facility] OR     COLONOSCOPY  2014    [facility]     GI SURGERY      sounds familar       Mental Health History  Depression: Yes, Reports having it most of his life was diagnosed, originally rx by psychiatrist, treatment plan works      Anxiety: Yes, Reports having it most of his life  Other: n/a     Medications  Current Outpatient Medications   Medication     albuterol (VENTOLIN HFA) 108 (90 Base) MCG/ACT inhaler     atorvastatin (LIPITOR) 10 MG tablet     budesonide-formoterol (SYMBICORT) 80-4.5 MCG/ACT Inhaler     cetirizine (ZYRTEC ALLERGY) 10 MG tablet     fexofenadine (ALLEGRA) 180 MG tablet     PARoxetine (PAXIL) 40 MG tablet     valsartan (DIOVAN) 80 MG tablet     No current facility-administered medications for this visit.      Blood thinner hx: None  Prednisone hx:  tried for lungs, dermatologist tried for rash, no daily use  Antibiotic hx: No daily use   Narcotic hx: None    Allergies  Compazine and Prochlorperazine    Pulmonary Tests and Status    6 Minute Walk: Not completed     Oxygen use   At rest: None   Sleep: None   With Activity: None    Sleep Study: None    BiPAP/CPAP: None    Pulmonary Rehab: Not completed    Current activity:  Basic ADLs    Feeding: No concerns noted  Toileting: No concerns noted  Selecting proper attire: No concerns noted  Grooming: No  Maintaining continence: No  Putting on clothing: Intermittent shortness of breath  Bathing: Intermittent shortness of breath  Walking &  Transferring: Feels subtle shortness of breath with walking but is able to breath     Instrumental ADLs    Managing Finances: No concerns noted  Handling Transportation: No concerns noted  Shopping: No concerns noted  Preparing meals: No concerns noted  Using telephone & communication devices: Has a computer in his home and is able to access internet  Managing medications:No concern noted, uses a med    Housework & basic home maintenance: Intermittent shortness of breath    Diagnostic Tests/Imaging  Heart cath: No  Stress Test: No  ECHO: 01/04/2019  Chest CT: 01/04/2019  DEXA: No   Upper GI: No    Primary Care  Health Maintenance   Topic Date Due     HEPATITIS C SCREENING  1955     ADVANCE CARE PLANNING  1955     COLONOSCOPY  07/06/1963     DTAP/TDAP/TD IMMUNIZATION (1 - Tdap) 07/06/1978     LIPID  07/06/1988     ZOSTER IMMUNIZATION (1 of 2) 07/06/2003     MEDICARE ANNUAL WELLNESS VISIT  07/06/2018     FALL RISK ASSESSMENT  07/06/2018     PNEUMOCOCCAL IMMUNIZATION 65+ LOW/MEDIUM RISK (1 of 2 - PCV13) 07/06/2018     AORTIC ANEURYSM SCREENING (SYSTEM ASSIGNED)  07/06/2018     PHQ-2  01/01/2019     INFLUENZA VACCINE (1) 09/01/2019     IPV IMMUNIZATION  Aged Out     MENINGITIS IMMUNIZATION  Aged Out     Colonoscopy: Yes, within last 10 year  Dental: Yes, No issues   Immunizations:    Labs  A1AT: No/ Unknown if completed  Rheumatology: No  Cystic Fibrosis: No  Cultures: No/Unknown if completed    Psycho-Social Assessment  Spouse/Significant Other/Partner: Heaven   Location: [place], [place]    Support System: Sons, adults   Location: [place], [facility] lives out of state    Employment Status: Part- time (6-8 weeks) Occupation: [employer] ()  Work history: Management, teacher at [employer],    Home Environment: All on same level, could be mold - old water damage could be under carpet     Pets/Birds: No Pets, No Birds    Home Health care utilized: None    Financial Concerns: No concerns  noted    Plan: PFT, 6MW, Labs, NPT with Dr Dillon 9/16/2019 1023

## 2019-08-19 ENCOUNTER — TELEPHONE (OUTPATIENT)
Dept: TRANSPLANT | Facility: CLINIC | Age: 64
End: 2019-08-19

## 2019-08-19 DIAGNOSIS — R91.8 PULMONARY NODULES: Primary | ICD-10-CM

## 2019-08-19 NOTE — TELEPHONE ENCOUNTER
Received message that patient has been unable to log into [facility] for transplant education.  Called and LM for patient to troubleshoot issues. Requested return call.

## 2019-08-27 ENCOUNTER — DOCUMENTATION ONLY (OUTPATIENT)
Dept: TRANSPLANT | Facility: CLINIC | Age: 64
End: 2019-08-27

## 2019-09-12 ENCOUNTER — TELEPHONE (OUTPATIENT)
Dept: TRANSPLANT | Facility: CLINIC | Age: 64
End: 2019-09-12

## 2019-09-12 NOTE — TELEPHONE ENCOUNTER
Patient called reporting he had questions regarding insurance information he had listed on his account when he completed pre-checking in tasks. Plan made for PFR to follow up with patient to review current insurance information and transplant coverage.

## 2019-09-16 ENCOUNTER — OFFICE VISIT (OUTPATIENT)
Dept: TRANSPLANT | Facility: CLINIC | Age: 64
End: 2019-09-16
Attending: INTERNAL MEDICINE
Payer: COMMERCIAL

## 2019-09-16 VITALS
HEART RATE: 87 BPM | OXYGEN SATURATION: 95 % | TEMPERATURE: 98.3 F | WEIGHT: 159.8 LBS | HEIGHT: 68 IN | BODY MASS INDEX: 24.22 KG/M2 | DIASTOLIC BLOOD PRESSURE: 84 MMHG | SYSTOLIC BLOOD PRESSURE: 156 MMHG

## 2019-09-16 DIAGNOSIS — Z76.82 LUNG TRANSPLANT CANDIDATE: ICD-10-CM

## 2019-09-16 DIAGNOSIS — D86.0 SARCOIDOSIS OF LUNG (H): ICD-10-CM

## 2019-09-16 DIAGNOSIS — Z76.82 LUNG TRANSPLANT CANDIDATE: Primary | ICD-10-CM

## 2019-09-16 LAB
6 MIN WALK (FT): 1615 FT
6 MIN WALK (M): 492 M
ALBUMIN SERPL-MCNC: 4.6 G/DL (ref 3.4–5)
ALP SERPL-CCNC: 80 U/L (ref 40–150)
ALT SERPL W P-5'-P-CCNC: 19 U/L (ref 0–70)
ANION GAP SERPL CALCULATED.3IONS-SCNC: <1 MMOL/L (ref 3–14)
AST SERPL W P-5'-P-CCNC: 16 U/L (ref 0–45)
BASE EXCESS BLDV CALC-SCNC: 5.5 MMOL/L
BILIRUB SERPL-MCNC: 0.8 MG/DL (ref 0.2–1.3)
BUN SERPL-MCNC: 8 MG/DL (ref 7–30)
CALCIUM SERPL-MCNC: 9.1 MG/DL (ref 8.5–10.1)
CHLORIDE SERPL-SCNC: 104 MMOL/L (ref 94–109)
CO2 SERPL-SCNC: 34 MMOL/L (ref 20–32)
CREAT SERPL-MCNC: 0.76 MG/DL (ref 0.66–1.25)
ERYTHROCYTE [DISTWIDTH] IN BLOOD BY AUTOMATED COUNT: 13.3 % (ref 10–15)
GFR SERPL CREATININE-BSD FRML MDRD: >90 ML/MIN/{1.73_M2}
GLUCOSE SERPL-MCNC: 102 MG/DL (ref 70–99)
HCO3 BLDV-SCNC: 35 MMOL/L (ref 21–28)
HCT VFR BLD AUTO: 42.6 % (ref 40–53)
HGB BLD-MCNC: 15.1 G/DL (ref 13.3–17.7)
MCH RBC QN AUTO: 30.4 PG (ref 26.5–33)
MCHC RBC AUTO-ENTMCNC: 35.4 G/DL (ref 31.5–36.5)
MCV RBC AUTO: 86 FL (ref 78–100)
O2/TOTAL GAS SETTING VFR VENT: ABNORMAL %
PCO2 BLDV: 69 MM HG (ref 40–50)
PH BLDV: 7.31 PH (ref 7.32–7.43)
PLATELET # BLD AUTO: 225 10E9/L (ref 150–450)
PO2 BLDV: 13 MM HG (ref 25–47)
POTASSIUM SERPL-SCNC: 4.4 MMOL/L (ref 3.4–5.3)
PROT SERPL-MCNC: 7.8 G/DL (ref 6.8–8.8)
RBC # BLD AUTO: 4.97 10E12/L (ref 4.4–5.9)
SODIUM SERPL-SCNC: 139 MMOL/L (ref 133–144)
WBC # BLD AUTO: 7.6 10E9/L (ref 4–11)

## 2019-09-16 PROCEDURE — 82803 BLOOD GASES ANY COMBINATION: CPT | Performed by: INTERNAL MEDICINE

## 2019-09-16 PROCEDURE — 80053 COMPREHEN METABOLIC PANEL: CPT | Performed by: INTERNAL MEDICINE

## 2019-09-16 PROCEDURE — 85027 COMPLETE CBC AUTOMATED: CPT | Performed by: INTERNAL MEDICINE

## 2019-09-16 PROCEDURE — G0463 HOSPITAL OUTPT CLINIC VISIT: HCPCS | Mod: ZF

## 2019-09-16 PROCEDURE — 36415 COLL VENOUS BLD VENIPUNCTURE: CPT | Performed by: INTERNAL MEDICINE

## 2019-09-16 ASSESSMENT — PAIN SCALES - GENERAL: PAINLEVEL: NO PAIN (0)

## 2019-09-16 ASSESSMENT — MIFFLIN-ST. JEOR: SCORE: 1479.35

## 2019-09-16 NOTE — PATIENT INSTRUCTIONS
"Pulmonary Rehab: Please remember to stay active.  Continue exercises learned in pulmonary rehab or continue participating in pulmonary rehab, if able.     Current oxygen use: Rest 0L Activity 0L Sleep 0L     5' 8\" 159 lbs 12.8 oz Body mass index is 24.3 kg/m .  Goal BMI greater than 18, less than 30 for lung transplant.     Medication changes: No changes made this visit  Future orders: No orders placed this visit  Antibody blood test (PRA) due every 3 months: No completed at this time  Next appointment: Will follow up based on Dr Lambert's assessment- He will re-refer you based on increased symptoms with significant limitations in ADLs, development of pulmonary hypertension, and FVC decreases  below 50%    Test or procedures to be complete prior to next appointment:   PFTs: Completed today, reviewed with patient by Dr Dillon   6MW: Completed today, reviewed with patient by Dr Dillon    CT Scan: Not completed this visit    Please remember to stay up to date with your primary care requirements including:     Annual check-ups with primary care physician   Mammogram   Pap smear (as appropriate for women)    PSA (for men over 50)   Dental visits   Annual flu shots/ immunizations as needed   Colonoscopy (patients over 50).     Thank-you for allowing us to participate in your care.    If your condition should change, please contact your transplant coordinator. This includes: worsening symptoms, need for antibiotics, hospitalizations, transfusions.    Thoracic Transplant Office phone [phone], option 2, fax [phone]    Office Hours 8:30 - 5:00 pm        "

## 2019-09-16 NOTE — PROGRESS NOTES
[facility]  Eddie Jones 64 year old male  : 1955  MRN: [id number]  DATE OF SERVICE 2019      Primary Care Provider: Darinel Beyer    Referring Provider: Nikko Lambert MD  CRITICAL CARE INTERNAL MED  [address] [place] [id number]    Reason For Visit: Severe lung disease, evaluation of lung transplant candidacy.    Assessment and Plan:  Eddie Jones is a 64 year old male with severe lung disease due to sarcoidosis/pulmonary histoplasmosis, presenting to clinic today for evaluation of lung transplant candidacy.  It appears that majority of manifestations of his disease are within major airways.  Bronchoscopy performed earlier this year showed severe narrowing of right upper lobe bronchus and right middle lobe bronchus.  He has minimal bilateral upper lobe fibrosis and several calcified mediastinal and hilar lymph nodes.  It is not entirely clear if this was due to sarcoidosis or due to pulmonary histoplasmosis resulting in fibrosing mediastinitis.  His lung function test shows severe obstructive airway disease without any significant evidence of air trapping.  His diffusion capacity is also well preserved.  The patient is not currently using supplemental oxygen and appears to have decent quality of life.  Although he does not have any contraindications for lung transplantation but I do not think that he is a transplant candidate at this time due to preservation of lung function, lack of supplemental oxygen needs, lack of significant fibrosis on chest imaging and adequate quality of life.  We discussed in detail the process of lung transplantation including survival statistics.  It was suggested to the patient and his wife that it is best to wait for consideration of lung transplantation given that the benefits of lung transplant at this time do not outweigh its risks.  The patient and his wife had concerns about  patient's advanced age, which was addressed by informing them that we consider lung transplant up to 75 years of age.  The patient may continue to follow with his current pulmonologist and he can be referred back to us if and when his lung function deteriorates.      In addition to a complete history and physical, I discussed transplant at length with the patient.  We reviewed the risks and benefits of transplantation.  Our discussion included the recent survival statistics.  I discussed rejection, including hyperacute, acute, chronic and antibody mediated.  I discussed the need for surveillance biopsies.  I reviewed the standard immunosuppression and typical side effects, including renal failure.  I discussed the increased risk of infection and the use of prophylactic antibiotics. I reviewed the increased risk of malignancy. I discussed the listing system, including the Lung Allocation Score.  I discussed the typical operative and postoperative course.  I reviewed the usual clinic followup.  I explained to the patient that he is required to stay in the [facility] for three months following transplantation and that he  will need to have someone accompanying him during that time.  I discussed the importance of strict medication and clinic adherence after transplantation. The patient had a number of questions which were answered to his apparent satisfaction. We encouraged the patient to call us with any questions that may arise in future.       History of Present Illness: The patient is a very pleasant 64-year-old gentleman with history of sarcoidosis, pulmonary histoplasmosis, treated hepatitis C, generalized anxiety disorder and depression, alcohol dependence currently in complete remission.  He is seen in clinic today for evaluation of lung transplant candidacy.    The patient reports that he had an episode of histoplasmosis diagnosed in 1992 and was treated with IV itraconazole.  Over next 4 to 5 years he  experienced worsening shortness of breath and was subsequently diagnosed with sarcoidosis following a lung biopsy.  I do not have access to those medical records to confirm these.    On a day-to-day basis he denies any shortness of breath at rest or with mild to moderate exertion.  He does report dyspnea while climbing stairs.  He plays golf often and has noted that he has to take the golf cart frequently.  He denies any cough, sputum production.  He reports frequent wheezing.  He reports occasional limitation in ADLs.  He denies any visual symptoms, neurological symptoms, current skin lesions, cardiac palpitations, lightheadedness, night sweats, unintentional weight loss or fevers.    He does report that earlier this year he developed a significant red rash on his trunk and back and groin.  This was biopsied by dermatology but did not get any formal diagnosis.  He is on antihistamine and ranitidine for treatment.  The rash was pruritic.    Review of Systems:  CONSTITUTIONAL: negative for fever, chills, change in weight  INTEGUMENTARY/SKIN: no rash or obvious new lesions  ENT/MOUTH: no sore throat, new sinus pain or nasal drainage  RESP: see interval history  CV: negative for chest pain, palpitations or peripheral edema  GI: no nausea, vomiting, change in stools  : no dysuria  MUSCULOSKELETAL: no myalgias, arthralgias  ENDOCRINE: blood sugars with adequate control  PSYCHIATRIC: mood stable  LYMPHATIC: no new lymphadenopathy  HEME: no bleeding or easy bruisability  NEURO: no numbness, weakness, headaches  Otherwise negative    Social History: The patient lives at home with his wife.  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  His wife works as a respiratory therapist in a local hospital.  He has 2 adult sons 1 lives in [place] and works as a , the other one lives in [facility]  and is currently in school.  He has a sister in [place] and brother in [place] that he occasionally visits.  Both him and his wife grew up in [place] and have most of their family there.  He reports occasional cigarette smoking in the past but quit over 30 years ago.  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  He reports to have significant alcohol dependence in the past but quit 13 years ago.  He denies any illicit drug use.    Past Medical History:  Past Medical History:   Diagnosis Date     Cancer (H) 2004    basal cell- nose, [facility]     Depressive disorder     diagnosed 20 yrs ago, on Paxil- PCP     Hepatitis     Hep C - followed by [facility]     Hypertension 07/2019    PCP       Past Surgical History:  History of thoracic surgery: None.  Past Surgical History:   Procedure Laterality Date     ABDOMEN SURGERY       APPENDECTOMY      55 years ago     BIOPSY      lung- [facility], [facility]     BIOPSY      skin-nose, back and chest- [facility]     BRONCHOSCOPY, DILATE BRONCHUS, STENT BRONCHUS, COMBINED N/A 7/26/2019    Procedure: Flexible Bronchoscopy, Endobronchial Biopsy;  Surgeon: Maynor Boswell MD;  Location: [facility] OR     COLONOSCOPY  2014    [facility]     GI SURGERY      sounds familar       Allergies:  Allergies   Allergen Reactions     Compazine Other (See Comments) and Swelling     Prochlorperazine Other (See Comments)     Swollen tongue/stiff neck  Swollen tongue and stiff neck   Compazine TABS         Medications:  Current Outpatient Medications   Medication Sig Dispense Refill     albuterol (VENTOLIN HFA) 108 (90 Base) MCG/ACT inhaler Inhale 2 puffs into the lungs       atorvastatin (LIPITOR) 10 MG tablet Take 10 mg by mouth daily        cetirizine (ZYRTEC ALLERGY) 10 MG tablet Take 10 mg by mouth 2 times daily        fexofenadine (ALLEGRA) 180 MG tablet Take 180 mg by mouth daily        PARoxetine (PAXIL) 40 MG  "tablet Take 40 mg by mouth every morning        ranitidine (ZANTAC) 150 MG tablet Take 150 mg by mouth 2 times daily       valsartan (DIOVAN) 80 MG tablet Take 80 mg by mouth daily       budesonide-formoterol (SYMBICORT) 80-4.5 MCG/ACT Inhaler Inhale 2 puffs into the lungs         Family History:    Mother had breast cancer in her 40s.  Father had coronary artery disease.  One brother  from pancreatic cancer at age 54.  Another brother  at age 70 from brain aneurysm.  Another brother had gastric cancer which is now treated.    Physical Examination:   VS: BP (!) 156/84   Pulse 87   Temp 98.3  F (36.8  C) (Oral)   Ht 1.727 m (5' 8\")   Wt 72.5 kg (159 lb 12.8 oz)   SpO2 95%   BMI 24.30 kg/m    General: Pleasant, speaking in full sentences without significant discomfort  Eyes: No conjunctival pallor or icterus  HENT: Moist mucous membranes, no cervical or submandibular lymphadenopathy  Pulmonary: Audible inspiratory and expiratory wheezing, mostly anteriorly throughout the upper chest.  Otherwise normal breath sounds.  Cardiovascular: Regular rate and rhythm, S1S2 normal with no added sounds  Abdomen: Soft, non tender, non distended, normoactive bowel sounds  Musculoskeletal: No lower extremities edema, no upper extremities tremors, clubbing or cyanosis  Skin: No rashes on limited exam  Neuro: Grossly normal  Psych: Normal affect    PFT's:  FVC 2.55 L, 62%.  FEV1 1.02 L, 32%.  FEV1/FVC 40%.  Total lung capacity 5.93, 88%.  Residual volume 2.94, 118%.  DLCO 70%.  This test shows mixed obstructive and restrictive ventilatory defect with preserved total lung capacity and no signs of air trapping.  Normal diffusion capacity noted.    6-minute walk test showed 492 m with lower limit of normal being 404 m.  Room air oxygen saturation pre-walk was 95% and dropped to 93%.  Heart rate change from 85 to 116 bpm.      Chest Imaging:    CT chest from 2019 was reviewed.  This showed minimal bilateral upper " lobe fibrotic changes, multiple calcified mediastinal and hilar lymph node noted.    Labs and Radiology:  Lab Results   Component Value Date    WBC 7.6 09/16/2019    HGB 15.1 09/16/2019    HCT 42.6 09/16/2019     09/16/2019     09/16/2019    POTASSIUM 4.4 09/16/2019    CHLORIDE 104 09/16/2019    CO2 34 (H) 09/16/2019    BUN 8 09/16/2019    CR 0.76 09/16/2019     (H) 09/16/2019    AST 16 09/16/2019    ALT 19 09/16/2019    ALKPHOS 80 09/16/2019    BILITOTAL 0.8 09/16/2019        I spent a total of 80 minutes face to face with Eddie Jones during today's office visit. Over 50% of this time was spent counseling the patient and/or coordinating care regarding their lung transplant candidacy.     Antonio Dillon MD   of Medicine  Pulmonary, Critical Care and Sleep Medicine  [facility]  Pager: [phone]

## 2019-09-16 NOTE — LETTER
2019      RE: Eddie Jones  [address] [place] [address]       [facility]  Eddie Jones 64 year old male  : 1955  MRN: [id number]  DATE OF SERVICE 2019      Primary Care Provider: Darinel Beyer    Referring Provider: Nikko Lambert MD  CRITICAL CARE INTERNAL MED  [address] [place] [address]    Reason For Visit: Severe lung disease, evaluation of lung transplant candidacy.    Assessment and Plan:  Eddie Jones is a 64 year old male with severe lung disease due to sarcoidosis/pulmonary histoplasmosis, presenting to clinic today for evaluation of lung transplant candidacy.  It appears that majority of manifestations of his disease are within major airways.  Bronchoscopy performed earlier this year showed severe narrowing of right upper lobe bronchus and right middle lobe bronchus.  He has minimal bilateral upper lobe fibrosis and several calcified mediastinal and hilar lymph nodes.  It is not entirely clear if this was due to sarcoidosis or due to pulmonary histoplasmosis resulting in fibrosing mediastinitis.  His lung function test shows severe obstructive airway disease without any significant evidence of air trapping.  His diffusion capacity is also well preserved.  The patient is not currently using supplemental oxygen and appears to have decent quality of life.  Although he does not have any contraindications for lung transplantation but I do not think that he is a transplant candidate at this time due to preservation of lung function, lack of supplemental oxygen needs, lack of significant fibrosis on chest imaging and adequate quality of life.  We discussed in detail the process of lung transplantation including survival statistics.  It was suggested to the patient and his wife that it is best to wait for consideration of lung transplantation given that the benefits of lung transplant at this  time do not outweigh its risks.  The patient and his wife had concerns about patient's advanced age, which was addressed by informing them that we consider lung transplant up to 75 years of age.  The patient may continue to follow with his current pulmonologist and he can be referred back to us if and when his lung function deteriorates.      In addition to a complete history and physical, I discussed transplant at length with the patient.  We reviewed the risks and benefits of transplantation.  Our discussion included the recent survival statistics.  I discussed rejection, including hyperacute, acute, chronic and antibody mediated.  I discussed the need for surveillance biopsies.  I reviewed the standard immunosuppression and typical side effects, including renal failure.  I discussed the increased risk of infection and the use of prophylactic antibiotics. I reviewed the increased risk of malignancy. I discussed the listing system, including the Lung Allocation Score.  I discussed the typical operative and postoperative course.  I reviewed the usual clinic followup.  I explained to the patient that he is required to stay in the [facility] for three months following transplantation and that he  will need to have someone accompanying him during that time.  I discussed the importance of strict medication and clinic adherence after transplantation. The patient had a number of questions which were answered to his apparent satisfaction. We encouraged the patient to call us with any questions that may arise in future.       History of Present Illness: The patient is a very pleasant 64-year-old gentleman with history of sarcoidosis, pulmonary histoplasmosis, treated hepatitis C, generalized anxiety disorder and depression, alcohol dependence currently in complete remission.  He is seen in clinic today for evaluation of lung transplant candidacy.    The patient reports that he had an episode of histoplasmosis diagnosed in  1992 and was treated with IV itraconazole.  Over next 4 to 5 years he experienced worsening shortness of breath and was subsequently diagnosed with sarcoidosis following a lung biopsy.  I do not have access to those medical records to confirm these.    On a day-to-day basis he denies any shortness of breath at rest or with mild to moderate exertion.  He does report dyspnea while climbing stairs.  He plays golf often and has noted that he has to take the golf cart frequently.  He denies any cough, sputum production.  He reports frequent wheezing.  He reports occasional limitation in ADLs.  He denies any visual symptoms, neurological symptoms, current skin lesions, cardiac palpitations, lightheadedness, night sweats, unintentional weight loss or fevers.    He does report that earlier this year he developed a significant red rash on his trunk and back and groin.  This was biopsied by dermatology but did not get any formal diagnosis.  He is on antihistamine and ranitidine for treatment.  The rash was pruritic.    Review of Systems:  CONSTITUTIONAL: negative for fever, chills, change in weight  INTEGUMENTARY/SKIN: no rash or obvious new lesions  ENT/MOUTH: no sore throat, new sinus pain or nasal drainage  RESP: see interval history  CV: negative for chest pain, palpitations or peripheral edema  GI: no nausea, vomiting, change in stools  : no dysuria  MUSCULOSKELETAL: no myalgias, arthralgias  ENDOCRINE: blood sugars with adequate control  PSYCHIATRIC: mood stable  LYMPHATIC: no new lymphadenopathy  HEME: no bleeding or easy bruisability  NEURO: no numbness, weakness, headaches  Otherwise negative    Social History: The patient lives at home with his wife.  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  His wife works as a respiratory therapist in a local hospital.  He has 2 adult sons 1 lives in [place] and  works as a , the other one lives in [facility] [place] [facility] and is currently in school.  He has a sister in [place] and brother in [place] that he occasionally visits.  Both him and his wife grew up in [place] and have most of their family there.  He reports occasional cigarette smoking in the past but quit over 30 years ago.  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  He reports to have significant alcohol dependence in the past but quit 13 years ago.  He denies any illicit drug use.    Past Medical History:  Past Medical History:   Diagnosis Date     Cancer (H) 2004    basal cell- nose, [facility]     Depressive disorder     diagnosed 20 yrs ago, on Paxil- PCP     Hepatitis     Hep C - followed by [facility]     Hypertension 07/2019    PCP       Past Surgical History:  History of thoracic surgery: None.  Past Surgical History:   Procedure Laterality Date     ABDOMEN SURGERY       APPENDECTOMY      55 years ago     BIOPSY      lung- [facility], [facility]     BIOPSY      skin-nose, back and chest- [place]     BRONCHOSCOPY, DILATE BRONCHUS, STENT BRONCHUS, COMBINED N/A 7/26/2019    Procedure: Flexible Bronchoscopy, Endobronchial Biopsy;  Surgeon: Maynor Boswell MD;  Location: [facility] OR     COLONOSCOPY  2014    [facility]     GI SURGERY      sounds familar       Allergies:  Allergies   Allergen Reactions     Compazine Other (See Comments) and Swelling     Prochlorperazine Other (See Comments)     Swollen tongue/stiff neck  Swollen tongue and stiff neck   Compazine TABS         Medications:  Current Outpatient Medications   Medication Sig Dispense Refill     albuterol (VENTOLIN HFA) 108 (90 Base) MCG/ACT inhaler Inhale 2 puffs into the lungs       atorvastatin (LIPITOR) 10 MG tablet Take 10 mg by mouth daily        cetirizine (ZYRTEC ALLERGY) 10 MG tablet Take 10 mg by mouth 2 times daily        fexofenadine (ALLEGRA)  "180 MG tablet Take 180 mg by mouth daily        PARoxetine (PAXIL) 40 MG tablet Take 40 mg by mouth every morning        ranitidine (ZANTAC) 150 MG tablet Take 150 mg by mouth 2 times daily       valsartan (DIOVAN) 80 MG tablet Take 80 mg by mouth daily       budesonide-formoterol (SYMBICORT) 80-4.5 MCG/ACT Inhaler Inhale 2 puffs into the lungs         Family History:    Mother had breast cancer in her 40s.  Father had coronary artery disease.  One brother  from pancreatic cancer at age 54.  Another brother  at age 70 from brain aneurysm.  Another brother had gastric cancer which is now treated.    Physical Examination:   VS: BP (!) 156/84   Pulse 87   Temp 98.3  F (36.8  C) (Oral)   Ht 1.727 m (5' 8\")   Wt 72.5 kg (159 lb 12.8 oz)   SpO2 95%   BMI 24.30 kg/m     General: Pleasant, speaking in full sentences without significant discomfort  Eyes: No conjunctival pallor or icterus  HENT: Moist mucous membranes, no cervical or submandibular lymphadenopathy  Pulmonary: Audible inspiratory and expiratory wheezing, mostly anteriorly throughout the upper chest.  Otherwise normal breath sounds.  Cardiovascular: Regular rate and rhythm, S1S2 normal with no added sounds  Abdomen: Soft, non tender, non distended, normoactive bowel sounds  Musculoskeletal: No lower extremities edema, no upper extremities tremors, clubbing or cyanosis  Skin: No rashes on limited exam  Neuro: Grossly normal  Psych: Normal affect    PFT's:  FVC 2.55 L, 62%.  FEV1 1.02 L, 32%.  FEV1/FVC 40%.  Total lung capacity 5.93, 88%.  Residual volume 2.94, 118%.  DLCO 70%.  This test shows mixed obstructive and restrictive ventilatory defect with preserved total lung capacity and no signs of air trapping.  Normal diffusion capacity noted.    6-minute walk test showed 492 m with lower limit of normal being 404 m.  Room air oxygen saturation pre-walk was 95% and dropped to 93%.  Heart rate change from 85 to 116 bpm.      Chest Imaging:    CT chest " from June 25, 2019 was reviewed.  This showed minimal bilateral upper lobe fibrotic changes, multiple calcified mediastinal and hilar lymph node noted.    Labs and Radiology:  Lab Results   Component Value Date    WBC 7.6 09/16/2019    HGB 15.1 09/16/2019    HCT 42.6 09/16/2019     09/16/2019     09/16/2019    POTASSIUM 4.4 09/16/2019    CHLORIDE 104 09/16/2019    CO2 34 (H) 09/16/2019    BUN 8 09/16/2019    CR 0.76 09/16/2019     (H) 09/16/2019    AST 16 09/16/2019    ALT 19 09/16/2019    ALKPHOS 80 09/16/2019    BILITOTAL 0.8 09/16/2019        I spent a total of 80 minutes face to face with Eddie Jones during today's office visit. Over 50% of this time was spent counseling the patient and/or coordinating care regarding their lung transplant candidacy.     Antonio Dillon MD   of Medicine  Pulmonary, Critical Care and Sleep Medicine  [facility]  Pager: [phone]

## 2019-09-16 NOTE — NURSING NOTE
"Chief Complaint   Patient presents with     Consult     Pre lung     BP (!) 156/84   Pulse 87   Temp 98.3  F (36.8  C) (Oral)   Ht 1.727 m (5' 8\")   Wt 72.5 kg (159 lb 12.8 oz)   SpO2 95%   BMI 24.30 kg/m    Stefani Beltran CMA    "

## 2019-09-17 LAB
DLCOCOR-%PRED-PRE: 70 %
DLCOCOR-PRE: 17.71 ML/MIN/MMHG
DLCOUNC-%PRED-PRE: 71 %
DLCOUNC-PRE: 17.95 ML/MIN/MMHG
DLCOUNC-PRED: 24.99 ML/MIN/MMHG
ERV-%PRED-PRE: 41 %
ERV-PRE: 0.49 L
ERV-PRED: 1.17 L
EXPTIME-PRE: 13.34 SEC
FEF2575-%PRED-POST: 17 %
FEF2575-%PRED-PRE: 15 %
FEF2575-POST: 0.45 L/SEC
FEF2575-PRE: 0.39 L/SEC
FEF2575-PRED: 2.53 L/SEC
FEFMAX-%PRED-PRE: 30 %
FEFMAX-PRE: 2.57 L/SEC
FEFMAX-PRED: 8.29 L/SEC
FEV1-%PRED-PRE: 32 %
FEV1-PRE: 1.02 L
FEV1FEV6-PRE: 45 %
FEV1FEV6-PRED: 78 %
FEV1FVC-PRE: 40 %
FEV1FVC-PRED: 77 %
FEV1SVC-PRE: 34 %
FEV1SVC-PRED: 70 %
FIFMAX-PRE: 2.42 L/SEC
FRCPLETH-%PRED-PRE: 96 %
FRCPLETH-PRE: 3.43 L
FRCPLETH-PRED: 3.55 L
FVC-%PRED-PRE: 62 %
FVC-PRE: 2.55 L
FVC-PRED: 4.11 L
IC-%PRED-PRE: 74 %
IC-PRE: 2.5 L
IC-PRED: 3.33 L
RVPLETH-%PRED-PRE: 118 %
RVPLETH-PRE: 2.94 L
RVPLETH-PRED: 2.48 L
TLCPLETH-%PRED-PRE: 88 %
TLCPLETH-PRE: 5.93 L
TLCPLETH-PRED: 6.72 L
VA-%PRED-PRE: 63 %
VA-PRE: 3.82 L
VC-%PRED-PRE: 66 %
VC-PRE: 2.99 L
VC-PRED: 4.5 L

## 2020-03-01 ENCOUNTER — HEALTH MAINTENANCE LETTER (OUTPATIENT)
Age: 65
End: 2020-03-01

## 2020-12-14 ENCOUNTER — HEALTH MAINTENANCE LETTER (OUTPATIENT)
Age: 65
End: 2020-12-14

## 2021-04-17 ENCOUNTER — HEALTH MAINTENANCE LETTER (OUTPATIENT)
Age: 66
End: 2021-04-17

## 2021-10-02 ENCOUNTER — HEALTH MAINTENANCE LETTER (OUTPATIENT)
Age: 66
End: 2021-10-02

## 2022-05-14 ENCOUNTER — HEALTH MAINTENANCE LETTER (OUTPATIENT)
Age: 67
End: 2022-05-14

## 2023-01-14 ENCOUNTER — HEALTH MAINTENANCE LETTER (OUTPATIENT)
Age: 68
End: 2023-01-14

## 2023-06-02 ENCOUNTER — HEALTH MAINTENANCE LETTER (OUTPATIENT)
Age: 68
End: 2023-06-02

## (undated) DEVICE — DRSG TELFA 3X8" 1238

## (undated) DEVICE — KIT ENDO FIRST STEP DISINFECTANT 200ML W/POUCH EP-4

## (undated) DEVICE — JELLY LUBRICATING SURGILUBE 2OZ TUBE

## (undated) DEVICE — LINEN TOWEL PACK X5 5464

## (undated) DEVICE — INFLATION DEVICE BIG 60 ENDO-AN6012

## (undated) DEVICE — SYR 30ML SLIP TIP W/O NDL 302833

## (undated) DEVICE — CATH BALLOON ELATION PULMONARY 2CM 8-9-10MMX100CM P8L20

## (undated) DEVICE — TUBING SUCTION 10'X3/16" N510

## (undated) DEVICE — NDL BLUNT 18GA 1" W/O FILTER 305181

## (undated) DEVICE — ANTIFOG SOLUTION W/FOAM PAD 31142527

## (undated) DEVICE — LABEL MEDICATION SYSTEM 3303-P

## (undated) DEVICE — ENDO FORCEP ALLIGATOR JAW BIOPSY 2MMX100CM FB-211D

## (undated) DEVICE — SYR 10ML SLIP TIP W/O NDL 303134

## (undated) DEVICE — ENDO ADPT BRONCH SWIVEL Y A1002

## (undated) RX ORDER — ALBUTEROL SULFATE 0.83 MG/ML
SOLUTION RESPIRATORY (INHALATION)
Status: DISPENSED
Start: 2019-09-16

## (undated) RX ORDER — LIDOCAINE HYDROCHLORIDE 20 MG/ML
INJECTION, SOLUTION EPIDURAL; INFILTRATION; INTRACAUDAL; PERINEURAL
Status: DISPENSED
Start: 2019-07-26

## (undated) RX ORDER — FENTANYL CITRATE 50 UG/ML
INJECTION, SOLUTION INTRAMUSCULAR; INTRAVENOUS
Status: DISPENSED
Start: 2019-07-26

## (undated) RX ORDER — ALBUTEROL SULFATE 0.83 MG/ML
SOLUTION RESPIRATORY (INHALATION)
Status: DISPENSED
Start: 2019-07-26

## (undated) RX ORDER — GLYCOPYRROLATE 0.2 MG/ML
INJECTION, SOLUTION INTRAMUSCULAR; INTRAVENOUS
Status: DISPENSED
Start: 2019-07-26

## (undated) RX ORDER — PROPOFOL 10 MG/ML
INJECTION, EMULSION INTRAVENOUS
Status: DISPENSED
Start: 2019-07-26

## (undated) RX ORDER — ONDANSETRON 2 MG/ML
INJECTION INTRAMUSCULAR; INTRAVENOUS
Status: DISPENSED
Start: 2019-07-26

## (undated) RX ORDER — DEXAMETHASONE SODIUM PHOSPHATE 4 MG/ML
INJECTION, SOLUTION INTRA-ARTICULAR; INTRALESIONAL; INTRAMUSCULAR; INTRAVENOUS; SOFT TISSUE
Status: DISPENSED
Start: 2019-07-26

## (undated) RX ORDER — HYDROMORPHONE HYDROCHLORIDE 1 MG/ML
INJECTION, SOLUTION INTRAMUSCULAR; INTRAVENOUS; SUBCUTANEOUS
Status: DISPENSED
Start: 2019-07-26

## (undated) RX ORDER — LIDOCAINE HYDROCHLORIDE 40 MG/ML
INJECTION, SOLUTION RETROBULBAR
Status: DISPENSED
Start: 2019-07-26

## (undated) RX ORDER — PHENYLEPHRINE HCL IN 0.9% NACL 1 MG/10 ML
SYRINGE (ML) INTRAVENOUS
Status: DISPENSED
Start: 2019-07-26